# Patient Record
Sex: MALE | Race: WHITE | Employment: OTHER | URBAN - METROPOLITAN AREA
[De-identification: names, ages, dates, MRNs, and addresses within clinical notes are randomized per-mention and may not be internally consistent; named-entity substitution may affect disease eponyms.]

---

## 2018-08-15 PROBLEM — Z87.898 HISTORY OF ELEVATED PSA: Status: ACTIVE | Noted: 2018-08-15

## 2023-07-31 LAB
BUN / CREAT RATIO: NORMAL
BUN BLDV-MCNC: 37 MG/DL
CREAT SERPL-MCNC: 1.48 MG/DL

## 2023-10-30 ENCOUNTER — OFFICE VISIT (OUTPATIENT)
Age: 88
End: 2023-10-30
Payer: COMMERCIAL

## 2023-10-30 DIAGNOSIS — N40.1 BENIGN PROSTATIC HYPERPLASIA WITH URINARY OBSTRUCTION: Primary | ICD-10-CM

## 2023-10-30 DIAGNOSIS — R97.20 ELEVATED PSA: ICD-10-CM

## 2023-10-30 DIAGNOSIS — N13.8 BENIGN PROSTATIC HYPERPLASIA WITH URINARY OBSTRUCTION: Primary | ICD-10-CM

## 2023-10-30 DIAGNOSIS — R35.1 NOCTURIA: ICD-10-CM

## 2023-10-30 LAB
BILIRUBIN, POC: NORMAL
BLOOD URINE, POC: NORMAL
CLARITY, POC: CLEAR
COLOR, POC: YELLOW
GLUCOSE URINE, POC: NORMAL
KETONES, POC: NORMAL
LEUKOCYTE EST, POC: NORMAL
NITRITE, POC: NORMAL
PH, POC: NORMAL
PROTEIN, POC: NORMAL
SPECIFIC GRAVITY, POC: NORMAL
UROBILINOGEN, POC: NORMAL

## 2023-10-30 PROCEDURE — 81003 URINALYSIS AUTO W/O SCOPE: CPT | Performed by: SPECIALIST

## 2023-10-30 PROCEDURE — 1123F ACP DISCUSS/DSCN MKR DOCD: CPT | Performed by: SPECIALIST

## 2023-10-30 PROCEDURE — 99214 OFFICE O/P EST MOD 30 MIN: CPT | Performed by: SPECIALIST

## 2023-10-30 RX ORDER — ALFUZOSIN HYDROCHLORIDE 10 MG/1
10 TABLET, EXTENDED RELEASE ORAL 2 TIMES DAILY
Qty: 180 TABLET | Refills: 3 | Status: SHIPPED | OUTPATIENT
Start: 2023-10-30

## 2023-10-30 RX ORDER — ASPIRIN 81 MG
TABLET,CHEWABLE ORAL
COMMUNITY
Start: 2023-09-12

## 2023-10-30 RX ORDER — CARVEDILOL 6.25 MG/1
6.25 TABLET ORAL EVERY 12 HOURS
COMMUNITY
Start: 2023-08-22

## 2023-10-30 RX ORDER — CLOPIDOGREL BISULFATE 75 MG/1
75 TABLET ORAL EVERY MORNING
COMMUNITY
Start: 2023-08-17

## 2023-10-30 RX ORDER — FUROSEMIDE 40 MG/1
40 TABLET ORAL DAILY
COMMUNITY
Start: 2023-10-04

## 2023-10-30 RX ORDER — LISINOPRIL 5 MG/1
5 TABLET ORAL DAILY
COMMUNITY
Start: 2023-03-04

## 2023-10-30 RX ORDER — POTASSIUM CHLORIDE 20MEQ/15ML
LIQUID (ML) ORAL
COMMUNITY
Start: 2023-08-17

## 2023-10-30 RX ORDER — ATORVASTATIN CALCIUM 40 MG/1
40 TABLET, FILM COATED ORAL NIGHTLY
COMMUNITY
Start: 2023-03-03

## 2023-10-30 NOTE — PROGRESS NOTES
[facility] [address] Urology Office Progress Note    Patient:  Remy Hooper  YOB: 1928  Date: 10/30/2023    HISTORY OF PRESENT ILLNESS:   The patient is a 80 y.o. male  Patient's lower urinary tract symptoms are stable on Alfuzosin 10 mg po bid for BPH symptoms. Patient instructed to limit fluid intake 2-3 hours prior to bedtime to minimize nocturia or nocturnal incontinence. Patient has a history of an Elevated PSA but given his age and the fact he is asymptomatic, no repeat PSAs are planned.    Lower urinary tract symptoms: urgency, frequency, and nocturia, 3 times per night   Last AUA Symptom Score (QOL): 17 (3)  Today's AUA Symptom Score (QOL): 13 (5)    Summary of old records:   Elevated PSA bx 2005 and 5/1/09; PSA max 15.30 on 4/30/15  BPH on alfuzosin 10 mg bid; can't use finasteride  Urgency: 3 coffee/day  Nocturia x3, XS fluids   Esophageal cancer Rx with XRT and chemoRx 2019    Additional History: none    Procedures Today: N/A    Urinalysis today:  Results for POC orders placed in visit on 10/30/23   POCT Urinalysis No Micro (Auto)   Result Value Ref Range    Color, UA yellow     Clarity, UA clear     Glucose, UA POC neg     Bilirubin, UA      Ketones, UA      Spec Grav, UA      Blood, UA POC neg     pH, UA      Protein, UA POC neg     Urobilinogen, UA      Leukocytes, UA trace     Nitrite, UA neg        Last several PSA's:  Lab Results   Component Value Date    PSA 28.39 09/09/2022    PSA 23.73 08/23/2021    PSA 17.42 02/07/2020       Last total testosterone:  No results found for: \"TESTOSTERONE\"    Last BUN and creatinine:  Lab Results   Component Value Date    BUN 37 07/31/2023     Lab Results   Component Value Date    CREATININE 1.48 07/31/2023       Last CBC:  No results found for: \"WBC\", \"HGB\", \"HCT\", \"MCV\", \"PLT\"    Additional Lab/Culture results: none    Imaging Reviewed during this Office Visit: CT Abd/Pelvis 08/4/23  Impression:     *  Stable

## 2024-09-25 ENCOUNTER — TELEPHONE (OUTPATIENT)
Age: 89
End: 2024-09-25